# Patient Record
Sex: MALE | Race: WHITE | NOT HISPANIC OR LATINO | URBAN - METROPOLITAN AREA
[De-identification: names, ages, dates, MRNs, and addresses within clinical notes are randomized per-mention and may not be internally consistent; named-entity substitution may affect disease eponyms.]

---

## 2018-02-06 ENCOUNTER — OFFICE VISIT (OUTPATIENT)
Dept: NEUROSURGERY | Facility: CLINIC | Age: 48
End: 2018-02-06

## 2018-02-06 VITALS
BODY MASS INDEX: 28.23 KG/M2 | HEIGHT: 74 IN | HEART RATE: 76 BPM | WEIGHT: 220 LBS | SYSTOLIC BLOOD PRESSURE: 117 MMHG | DIASTOLIC BLOOD PRESSURE: 76 MMHG

## 2018-02-06 DIAGNOSIS — M51.36 DEGENERATION OF LUMBAR INTERVERTEBRAL DISC: Primary | ICD-10-CM

## 2018-02-06 PROCEDURE — 99212 OFFICE O/P EST SF 10 MIN: CPT | Performed by: PHYSICIAN ASSISTANT

## 2018-02-06 NOTE — PROGRESS NOTES
Subjective   Patient ID: Johnnie Lofton is a 47 y.o. male is here today for follow-up.  He is 2 years out from a right L5-S1 laminectomy by Dr. Prieto 10/30/15.  He complains of right calf tightness and spasms.  He had therapy three months ago which did help a little.  Mr. Solo has Baclofen 10 mg ordered at last visit which does help.      Leg Pain    There was no injury mechanism. Pain location: right calf  The quality of the pain is described as cramping. The pain is at a severity of 0/10. The patient is experiencing no pain. Associated symptoms include muscle weakness. Pertinent negatives include no numbness or tingling. Treatments tried: PT, dry needling  The treatment provided mild relief.       The following portions of the patient's history were reviewed and updated as appropriate: allergies, current medications, past family history, past medical history, past social history, past surgical history and problem list.    Review of Systems   Constitutional: Negative for fever.   Genitourinary: Negative for difficulty urinating.   Musculoskeletal: Positive for myalgias.   Neurological: Positive for weakness. Negative for tingling and numbness.   All other systems reviewed and are negative.      Objective   Physical Exam   Constitutional: He is oriented to person, place, and time. He appears well-developed and well-nourished.   HENT:   Head: Normocephalic and atraumatic.   Right Ear: External ear normal.   Left Ear: External ear normal.   Eyes: Conjunctivae and EOM are normal. Pupils are equal, round, and reactive to light. Right eye exhibits no discharge. Left eye exhibits no discharge.   Neck: Normal range of motion. Neck supple. No tracheal deviation present.   Pulmonary/Chest: Effort normal. No stridor. No respiratory distress.   Musculoskeletal: Normal range of motion. He exhibits no edema, tenderness or deformity.   Neurological: He is alert and oriented to person, place, and time. He has normal strength  and normal reflexes. He displays no atrophy, no tremor and normal reflexes. No cranial nerve deficit or sensory deficit. He exhibits normal muscle tone. He displays a negative Romberg sign. He displays no seizure activity. Coordination and gait normal.   No long tract signs    Has mild 5-/5 R gastroc weakness   Skin: Skin is warm and dry.   Psychiatric: He has a normal mood and affect. His behavior is normal. Judgment and thought content normal.   Nursing note and vitals reviewed.    Neurologic Exam     Mental Status   Oriented to person, place, and time.     Cranial Nerves     CN III, IV, VI   Pupils are equal, round, and reactive to light.  Extraocular motions are normal.     Motor Exam     Strength   Strength 5/5 throughout.       Assessment/Plan   Independent Review of Radiographic Studies:      Medical Decision Making:    Mr. Lofton underwent a previous right L5-S1 laminectomy/discectomy with Dr. Prieto in October 2015.  At the time he had severe pain and per his office note had severe weakness in the right calf preoperatively as well.  He has done extremely well since surgery and really no longer has any back pain or leg pain but reports mild residual weakness in his calf and has noticed cramping in the right calf first thing in the morning since surgery. The spasm usually occurs when he wakes up and stretches.  Then he will be fine all day thereafter. It occurs more often in winter and he admits that he is far more sedentary during the winter since he does golf a lot in the warmer months.  He did go to physical therapy and they did some dry needling and stretching in the right calf that did help.  He denies any radicular pain or numbness or tingling and the symptoms have been present since surgery without any recent worsening.  If anything they have improved slightly over the last 18 months.  He does still have some mild weakness in the right calf although far improved compared to preoperatively.  We  discussed the fact that if he has calf weakness he will always be more vulnerable in that muscle to muscle spasm particularly after any prolonged period of inactivity.  I recommended a course of physical therapy for strengthening and recommended daily stretching.  I did offer to order a new MRI but he would like to hold off at this point which I think is certainly reasonable.  I told him to call if the symptoms do not improve.      Johnnie was seen today for leg pain.    Diagnoses and all orders for this visit:    Degeneration of lumbar intervertebral disc  -     Ambulatory Referral to Physical Therapy Evaluate and treat (2-3 times per week for 4wks. focus on core strength, R calf weakness and modalities)    Return if symptoms worsen or fail to improve.

## 2018-08-27 ENCOUNTER — OFFICE VISIT (OUTPATIENT)
Dept: NEUROSURGERY | Facility: CLINIC | Age: 48
End: 2018-08-27

## 2018-08-27 VITALS
BODY MASS INDEX: 28.75 KG/M2 | WEIGHT: 224 LBS | SYSTOLIC BLOOD PRESSURE: 113 MMHG | DIASTOLIC BLOOD PRESSURE: 79 MMHG | HEART RATE: 75 BPM | HEIGHT: 74 IN

## 2018-08-27 DIAGNOSIS — M54.16 LUMBAR RADICULOPATHY: ICD-10-CM

## 2018-08-27 DIAGNOSIS — M51.36 DEGENERATION OF LUMBAR INTERVERTEBRAL DISC: Primary | ICD-10-CM

## 2018-08-27 PROCEDURE — 99213 OFFICE O/P EST LOW 20 MIN: CPT | Performed by: PHYSICIAN ASSISTANT

## 2018-08-27 RX ORDER — METHYLPREDNISOLONE 4 MG/1
TABLET ORAL
Refills: 0 | COMMUNITY
Start: 2018-08-15 | End: 2018-08-27

## 2018-08-27 RX ORDER — HYDROCODONE BITARTRATE AND ACETAMINOPHEN 5; 325 MG/1; MG/1
TABLET ORAL
Refills: 0 | COMMUNITY
Start: 2018-08-15 | End: 2018-09-06

## 2018-08-27 NOTE — PROGRESS NOTES
Subjective   Patient ID: Johnnie Lofton is a 47 y.o. male is here today for follow-up on right calf pain.  He is 2 1/2 years out from a right L5-S1 laminectomy by Dr. Prieto.  He is currently still going to PT with little improvement.  He states over the past two weeks he has noticed increased R leg pain.  He completed a MDP with moderate but short relief. Mr. Lofton takes Hydrocodone 5/325 prn and Baclofen prn for pain.     Leg Pain    The incident occurred more than 1 week ago. There was no injury mechanism. The pain is present in the right leg and right ankle. The quality of the pain is described as aching. The pain is at a severity of 5/10. The pain is moderate. The pain has been intermittent since onset. Associated symptoms include muscle weakness and tingling. Pertinent negatives include no numbness. Exacerbated by: sitting  He has tried heat (PT, MDP, biofreeze ) for the symptoms. The treatment provided mild relief.       The following portions of the patient's history were reviewed and updated as appropriate: allergies, current medications, past family history, past medical history, past social history, past surgical history and problem list.    Review of Systems   Musculoskeletal:        R leg pain    Neurological: Positive for tingling. Negative for numbness.   All other systems reviewed and are negative.      Objective   Physical Exam   Constitutional: He is oriented to person, place, and time. He appears well-developed and well-nourished.   HENT:   Head: Normocephalic and atraumatic.   Right Ear: External ear normal.   Left Ear: External ear normal.   Eyes: Pupils are equal, round, and reactive to light. Conjunctivae and EOM are normal. Right eye exhibits no discharge. Left eye exhibits no discharge.   Neck: Normal range of motion. Neck supple. No tracheal deviation present.   Pulmonary/Chest: Effort normal. No stridor. No respiratory distress.   Musculoskeletal: Normal range of motion. He exhibits no  edema, tenderness or deformity.   Neurological: He is alert and oriented to person, place, and time. He has normal strength and normal reflexes. He displays no atrophy, no tremor and normal reflexes. No cranial nerve deficit or sensory deficit. He exhibits normal muscle tone. He displays a negative Romberg sign. He displays no seizure activity. Coordination and gait normal.   No long tract signs   Skin: Skin is warm and dry.   Psychiatric: He has a normal mood and affect. His behavior is normal. Judgment and thought content normal.   Nursing note and vitals reviewed.    Neurologic Exam     Mental Status   Oriented to person, place, and time.     Cranial Nerves     CN III, IV, VI   Pupils are equal, round, and reactive to light.  Extraocular motions are normal.     Motor Exam     Strength   Strength 5/5 throughout.       Assessment/Plan   Independent Review of Radiographic Studies:      Medical Decision Making:    Mr. Lofton underwent a previous right L5-S1 laminectomy/discectomy with Dr. Prieto in October 2015.  At the time he had severe pain and per his office note had severe weakness in the right calf preoperatively as well.  He did very well postoperatively but has always had some mild right calf weakness and intermittent muscle spasms since that time.  Last time he was here he really did not have any back or leg pain.  He was referred to physical therapy which he states did help significantly in regards to the spasms in the calf.  Unfortunately over the last 2 months he has developed increasing back and right buttock and lateral leg pain.  He does not feel that his very mild 5 minus out of 5 right calf weakness is any different.    The pain is described as a burning pain which is quite constant and moderate to severe.  He denies any incontinence or sensory changes.    I will send him for new MRI and x-rays.  I did review an EMG report done by his physical therapist on May 24, 2018 which suggested an acute S1  radicular process.    He will follow-up after the imaging to see Dr. Prieto.   Johnnie was seen today for leg pain.    Diagnoses and all orders for this visit:    Degeneration of lumbar intervertebral disc  -     MRI Lumbar Spine With & Without Contrast; Future  -     XR Spine Lumbar Complete With Flex & Ext; Future    Lumbar radiculopathy  -     MRI Lumbar Spine With & Without Contrast; Future  -     XR Spine Lumbar Complete With Flex & Ext; Future      Return for follow up after radiology test with Dr. Prieto.

## 2018-08-28 ENCOUNTER — HOSPITAL ENCOUNTER (OUTPATIENT)
Dept: GENERAL RADIOLOGY | Facility: HOSPITAL | Age: 48
Discharge: HOME | End: 2018-08-28
Admitting: PHYSICIAN ASSISTANT

## 2018-08-28 DIAGNOSIS — M51.36 DEGENERATION OF LUMBAR INTERVERTEBRAL DISC: ICD-10-CM

## 2018-08-28 DIAGNOSIS — M54.16 LUMBAR RADICULOPATHY: ICD-10-CM

## 2018-08-28 PROCEDURE — 72114 X-RAY EXAM L-S SPINE BENDING: CPT

## 2018-09-05 NOTE — PROGRESS NOTES
Subjective   Patient ID: Johnnie Lofton is a 47 y.o. male is here today for follow-up on right leg pain with tingling with a new MRI and XR. His pain has improved.    History of Present Illness     This patient returns today.  He is doing well overall.  His pain was so severe in his right thigh and right calf is completely gone.  He still has a lot of spasms in his right calf.    The following portions of the patient's history were reviewed and updated as appropriate: allergies, current medications, past family history, past medical history, past social history, past surgical history and problem list.    Review of Systems   Respiratory: Negative for chest tightness and shortness of breath.    Cardiovascular: Negative for chest pain.   Musculoskeletal:        Right leg pain   Neurological:        [place]   All other systems reviewed and are negative.      Objective   Physical Exam   Constitutional: He is oriented to person, place, and time. He appears well-developed and well-nourished.   Neurological: He is oriented to person, place, and time.     Neurologic Exam     Mental Status   Oriented to person, place, and time.       Assessment/Plan   Independent Review of Radiographic Studies:      I reviewed plain films of the lumbar spine done on 28 August.  These show no evidence of overt instability.  On the MRI done also on 28 August there is some stenosis at L4 5 but not severe.  L5-S1 shows postsurgical changes on the right side with no evidence of recurrent disc herniation.    Medical Decision Making:      I told the patient and his wife about the imaging.  I told him that from my point of view that since he is feeling better we don't really need to do anything else.  If anything flares up in the future he is to call me.    Johnnie was seen today for leg pain.    Diagnoses and all orders for this visit:    Degeneration of lumbar intervertebral disc      Return if symptoms worsen or fail to improve.

## 2018-09-06 ENCOUNTER — OFFICE VISIT (OUTPATIENT)
Dept: NEUROSURGERY | Facility: CLINIC | Age: 48
End: 2018-09-06

## 2018-09-06 VITALS — DIASTOLIC BLOOD PRESSURE: 77 MMHG | HEART RATE: 73 BPM | SYSTOLIC BLOOD PRESSURE: 118 MMHG

## 2018-09-06 DIAGNOSIS — M51.36 DEGENERATION OF LUMBAR INTERVERTEBRAL DISC: Primary | ICD-10-CM

## 2018-09-06 PROCEDURE — 99213 OFFICE O/P EST LOW 20 MIN: CPT | Performed by: NEUROLOGICAL SURGERY

## 2018-09-06 RX ORDER — MELOXICAM 15 MG/1
15 TABLET ORAL DAILY
COMMUNITY
End: 2019-02-08

## 2019-11-08 ENCOUNTER — OFFICE (OUTPATIENT)
Dept: URBAN - METROPOLITAN AREA PATHOLOGY 4 | Facility: PATHOLOGY | Age: 49
End: 2019-11-08

## 2019-11-08 ENCOUNTER — AMBULATORY SURGICAL CENTER (OUTPATIENT)
Dept: URBAN - METROPOLITAN AREA SURGERY 20 | Facility: SURGERY | Age: 49
End: 2019-11-08
Payer: COMMERCIAL

## 2019-11-08 DIAGNOSIS — K52.9 NONINFECTIVE GASTROENTERITIS AND COLITIS, UNSPECIFIED: ICD-10-CM

## 2019-11-08 DIAGNOSIS — Z12.11 ENCOUNTER FOR SCREENING FOR MALIGNANT NEOPLASM OF COLON: ICD-10-CM

## 2019-11-08 DIAGNOSIS — K63.3 ULCER OF INTESTINE: ICD-10-CM

## 2019-11-08 LAB
GI HISTOLOGY: A. SELECT: (no result)
GI HISTOLOGY: B. SELECT: (no result)
GI HISTOLOGY: PDF REPORT: (no result)

## 2019-11-08 PROCEDURE — 88305 TISSUE EXAM BY PATHOLOGIST: CPT

## 2019-11-08 PROCEDURE — 45380 COLONOSCOPY AND BIOPSY: CPT | Mod: 33

## 2019-11-08 NOTE — SERVICEHPINOTES
MARILYN BECKFORD  is a  49  male   who presents today for a  Colonoscopy   for   the indications listed below. The updated Patient Profile was reviewed prior to the procedure, in conjunction with the Physical Exam, including medical conditions, surgical procedures, medications, allergies, family history and social history. See Physical Exam time stamp below for date and time of HPI completion.Pre-operatively, I reviewed the indication(s) for the procedure, the risks of the procedure [including but not limited to: unexpected bleeding possibly requiring hospitalization and/or unplanned repeat procedures, perforation possibly requiring surgical treatment, missed lesions and complications of sedation/MAC (also explained by anesthesia staff)]. I have evaluated the patient for risks associated with the planned anesthesia and the procedure to be performed and find the patient an acceptable candidate for IV sedation.Multiple opportunities were provided for any questions or concerns, and all questions were answered satisfactorily before any anesthesia was administered. We will proceed with the planned procedure.BR

## 2019-11-22 ENCOUNTER — OFFICE (OUTPATIENT)
Dept: URBAN - METROPOLITAN AREA CLINIC 66 | Facility: CLINIC | Age: 49
End: 2019-11-22

## 2019-11-22 VITALS
DIASTOLIC BLOOD PRESSURE: 72 MMHG | SYSTOLIC BLOOD PRESSURE: 118 MMHG | HEIGHT: 74 IN | HEART RATE: 83 BPM | WEIGHT: 228 LBS

## 2019-11-22 DIAGNOSIS — K52.9 NONINFECTIVE GASTROENTERITIS AND COLITIS, UNSPECIFIED: ICD-10-CM

## 2019-11-22 DIAGNOSIS — R10.9 UNSPECIFIED ABDOMINAL PAIN: ICD-10-CM

## 2019-11-22 PROCEDURE — 99214 OFFICE O/P EST MOD 30 MIN: CPT

## 2019-11-22 RX ORDER — DICYCLOMINE HYDROCHLORIDE 10 MG/1
40 CAPSULE ORAL
Qty: 30 | Refills: 1 | Status: ACTIVE
Start: 2019-11-22

## 2021-03-30 ENCOUNTER — BULK ORDERING (OUTPATIENT)
Dept: CASE MANAGEMENT | Facility: OTHER | Age: 51
End: 2021-03-30

## 2021-03-30 DIAGNOSIS — Z23 IMMUNIZATION DUE: ICD-10-CM
